# Patient Record
Sex: MALE | Race: WHITE | Employment: FULL TIME | URBAN - METROPOLITAN AREA
[De-identification: names, ages, dates, MRNs, and addresses within clinical notes are randomized per-mention and may not be internally consistent; named-entity substitution may affect disease eponyms.]

---

## 2017-11-14 NOTE — TELEPHONE ENCOUNTER
Refill request for Lamotrigine 150 mg taking 2 tabs in the morning and 3 tabs in the evening, qt:150 0 refills    LOV: 11/28/16  NOV: None  Last refill: 11/28/16 qt:150 12 refills

## 2017-11-15 RX ORDER — LAMOTRIGINE 150 MG/1
TABLET ORAL
Qty: 150 TABLET | Refills: 0 | Status: SHIPPED | OUTPATIENT
Start: 2017-11-15 | End: 2017-12-19

## 2017-12-12 RX ORDER — CARBAMAZEPINE 300 MG/1
CAPSULE, EXTENDED RELEASE ORAL
Qty: 120 CAPSULE | Refills: 0 | OUTPATIENT
Start: 2017-12-12

## 2017-12-14 ENCOUNTER — TELEPHONE (OUTPATIENT)
Dept: NEUROLOGY | Facility: CLINIC | Age: 52
End: 2017-12-14

## 2017-12-15 ENCOUNTER — OFFICE VISIT (OUTPATIENT)
Dept: INTERNAL MEDICINE CLINIC | Facility: CLINIC | Age: 52
End: 2017-12-15

## 2017-12-15 VITALS
HEART RATE: 76 BPM | SYSTOLIC BLOOD PRESSURE: 132 MMHG | HEIGHT: 72 IN | RESPIRATION RATE: 19 BRPM | OXYGEN SATURATION: 99 % | WEIGHT: 223 LBS | DIASTOLIC BLOOD PRESSURE: 78 MMHG | BODY MASS INDEX: 30.2 KG/M2 | TEMPERATURE: 98 F

## 2017-12-15 DIAGNOSIS — Z23 NEED FOR INFLUENZA VACCINATION: ICD-10-CM

## 2017-12-15 DIAGNOSIS — E78.2 MIXED HYPERLIPIDEMIA: ICD-10-CM

## 2017-12-15 DIAGNOSIS — Z12.5 PROSTATE CANCER SCREENING: ICD-10-CM

## 2017-12-15 DIAGNOSIS — G40.909 SEIZURE DISORDER (HCC): Primary | ICD-10-CM

## 2017-12-15 DIAGNOSIS — Z28.20 VACCINE REFUSED BY PATIENT: ICD-10-CM

## 2017-12-15 DIAGNOSIS — E66.9 OBESITY (BMI 30-39.9): ICD-10-CM

## 2017-12-15 DIAGNOSIS — L40.50 PSORIATIC ARTHRITIS (HCC): ICD-10-CM

## 2017-12-15 PROCEDURE — 99213 OFFICE O/P EST LOW 20 MIN: CPT | Performed by: INTERNAL MEDICINE

## 2017-12-16 PROBLEM — E78.2 MIXED HYPERLIPIDEMIA: Status: ACTIVE | Noted: 2017-12-16

## 2017-12-16 NOTE — PROGRESS NOTES
HPI:    Patient ID: Olga Syed is a 45year old male. HPI     Patient is her for follow up, also needing referral to see Neuro for seizure disorder. Had been doing well with no recurrence of seizure.    He had been traveling and maintains active l person, place, and time. Skin: No rash noted. Psychiatric: Judgment normal.   Nursing note and vitals reviewed.              ASSESSMENT/PLAN:   Seizure disorder (hcc)  Psoriatic arthritis (hcc)  Need for influenza vaccination  (primary encounter diagnos

## 2017-12-19 ENCOUNTER — OFFICE VISIT (OUTPATIENT)
Dept: NEUROLOGY | Facility: CLINIC | Age: 52
End: 2017-12-19

## 2017-12-19 ENCOUNTER — APPOINTMENT (OUTPATIENT)
Dept: LAB | Facility: HOSPITAL | Age: 52
End: 2017-12-19
Attending: INTERNAL MEDICINE
Payer: COMMERCIAL

## 2017-12-19 VITALS
HEART RATE: 64 BPM | DIASTOLIC BLOOD PRESSURE: 86 MMHG | HEIGHT: 72 IN | RESPIRATION RATE: 16 BRPM | BODY MASS INDEX: 30.61 KG/M2 | SYSTOLIC BLOOD PRESSURE: 128 MMHG | WEIGHT: 226 LBS

## 2017-12-19 DIAGNOSIS — Z12.5 PROSTATE CANCER SCREENING: ICD-10-CM

## 2017-12-19 DIAGNOSIS — G40.909 SEIZURE DISORDER (HCC): Primary | ICD-10-CM

## 2017-12-19 PROCEDURE — 99213 OFFICE O/P EST LOW 20 MIN: CPT | Performed by: OTHER

## 2017-12-19 PROCEDURE — 36415 COLL VENOUS BLD VENIPUNCTURE: CPT | Performed by: INTERNAL MEDICINE

## 2017-12-19 PROCEDURE — 80061 LIPID PANEL: CPT | Performed by: INTERNAL MEDICINE

## 2017-12-19 PROCEDURE — 80053 COMPREHEN METABOLIC PANEL: CPT | Performed by: INTERNAL MEDICINE

## 2017-12-19 PROCEDURE — 85025 COMPLETE CBC W/AUTO DIFF WBC: CPT | Performed by: INTERNAL MEDICINE

## 2017-12-19 RX ORDER — CARBAMAZEPINE 300 MG/1
600 CAPSULE, EXTENDED RELEASE ORAL 2 TIMES DAILY
Qty: 120 CAPSULE | Refills: 12 | Status: SHIPPED | OUTPATIENT
Start: 2017-12-19 | End: 2018-10-31

## 2017-12-19 RX ORDER — LAMOTRIGINE 150 MG/1
TABLET ORAL
Qty: 150 TABLET | Refills: 12 | Status: SHIPPED | OUTPATIENT
Start: 2017-12-19 | End: 2018-10-31

## 2017-12-26 ENCOUNTER — TELEPHONE (OUTPATIENT)
Dept: INTERNAL MEDICINE CLINIC | Facility: CLINIC | Age: 52
End: 2017-12-26

## 2017-12-26 DIAGNOSIS — Z51.81 THERAPEUTIC DRUG MONITORING: ICD-10-CM

## 2017-12-26 DIAGNOSIS — L40.50 PSA (PSORIATIC ARTHRITIS) (HCC): Primary | ICD-10-CM

## 2017-12-26 DIAGNOSIS — L40.50 PSORIATIC ARTHRITIS (HCC): Primary | ICD-10-CM

## 2017-12-26 NOTE — TELEPHONE ENCOUNTER
Pt called in requesting a new rx to be sent to his pharmacy regarding Humira medication below. Pt states the pharmacy has an  script. Pt states the Humira is to be sent to /[facility] - phone # [phone].   Pt would li

## 2017-12-26 NOTE — TELEPHONE ENCOUNTER
LOV 11/21/16 -No F/u sched     Called patient - booked FA apt 2/14/18 at 2:50pm.    Humira-Needs refill asap. Pt reports he has 2 weeks left of med but it takes 5 days to get med from mail order pharm after ordered.      Pended med for PP

## 2017-12-26 NOTE — TELEPHONE ENCOUNTER
Sent in 1month of meds - that should be fine until next appt. Or he can make an appt. Sooner.    He needs his yearly tb test done and can come to the labs and get that - please let him know

## 2017-12-26 NOTE — TELEPHONE ENCOUNTER
Spoke with Gonzalo Patel and discussed note below from [address] [place] [address]. Pt states he will have TB test drawn tomorrow 12/27/17.

## 2017-12-27 ENCOUNTER — APPOINTMENT (OUTPATIENT)
Dept: LAB | Age: 52
End: 2017-12-27
Attending: INTERNAL MEDICINE
Payer: COMMERCIAL

## 2017-12-27 ENCOUNTER — TELEPHONE (OUTPATIENT)
Dept: RHEUMATOLOGY | Facility: CLINIC | Age: 52
End: 2017-12-27

## 2017-12-27 DIAGNOSIS — Z51.81 THERAPEUTIC DRUG MONITORING: ICD-10-CM

## 2017-12-27 DIAGNOSIS — L40.50 PSA (PSORIATIC ARTHRITIS) (HCC): ICD-10-CM

## 2017-12-27 PROCEDURE — 36415 COLL VENOUS BLD VENIPUNCTURE: CPT

## 2017-12-27 PROCEDURE — 86480 TB TEST CELL IMMUN MEASURE: CPT

## 2017-12-27 NOTE — TELEPHONE ENCOUNTER
Form completed for pt assistance for humira pre filled syringes. Faxed to [phone]. Pt follow up scheduled 02/14/18.

## 2018-02-14 ENCOUNTER — OFFICE VISIT (OUTPATIENT)
Dept: RHEUMATOLOGY | Facility: CLINIC | Age: 53
End: 2018-02-14

## 2018-02-14 VITALS
HEIGHT: 72 IN | BODY MASS INDEX: 31.27 KG/M2 | WEIGHT: 230.88 LBS | DIASTOLIC BLOOD PRESSURE: 79 MMHG | SYSTOLIC BLOOD PRESSURE: 132 MMHG | TEMPERATURE: 98 F

## 2018-02-14 DIAGNOSIS — Z51.81 THERAPEUTIC DRUG MONITORING: ICD-10-CM

## 2018-02-14 DIAGNOSIS — L40.50 PSA (PSORIATIC ARTHRITIS) (HCC): Primary | ICD-10-CM

## 2018-02-14 PROCEDURE — 99214 OFFICE O/P EST MOD 30 MIN: CPT | Performed by: INTERNAL MEDICINE

## 2018-02-14 PROCEDURE — 99212 OFFICE O/P EST SF 10 MIN: CPT | Performed by: INTERNAL MEDICINE

## 2018-02-14 NOTE — PROGRESS NOTES
Rhiannon Ferraro is a 45year old male who presents for Patient presents with:  Psoriatic Arthritis: follow up  . HPI:     I had the pleasure of seeing Rhiannon Ferraro on 11/21/2016 for evaluation.      He is  A pleasant 46year old who has psoriatic art CarBAMazepine  MG Oral Capsule SR 12 Hr Take 2 capsules (600 mg total) by mouth 2 (two) times daily.  Disp: 120 capsule Rfl: 12   lamoTRIgine 150 MG Oral Tab 2 tablets in the morning and 3 tablets in the evening Disp: 150 tablet Rfl: 12      Past Medi /79 (BP Location: Left arm, Patient Position: Sitting, Cuff Size: large)   Temp 97.9 °F (36.6 °C) (Oral)   Ht 6' (1.829 m)   Wt 230 lb 14.4 oz (104.7 kg)   BMI 31.32 kg/m²   HEENT: Clear oropharynx, no oral ulcers, EOM intact, clear sclear, PERRLA, p Platelet Count      335 - 400 K/UL  258   MEAN PLATELET VOLUME      7.4 - 10.3 fL  7.3 (L)   Neutrophils %      %  50   Lymphocytes %      %  39   Monocytes %      %  9   Eosinophils %      %  2   Basophils %      %  1   Neutrophils Absolute      1.8 - 7.7

## 2018-03-16 ENCOUNTER — MED REC SCAN ONLY (OUTPATIENT)
Dept: RHEUMATOLOGY | Facility: CLINIC | Age: 53
End: 2018-03-16

## 2018-05-03 ENCOUNTER — TELEPHONE (OUTPATIENT)
Dept: RHEUMATOLOGY | Facility: CLINIC | Age: 53
End: 2018-05-03

## 2018-05-03 NOTE — TELEPHONE ENCOUNTER
PA started with [employer] [phone] RN#[id number]. Script will have to go to [facility].

## 2018-05-07 NOTE — TELEPHONE ENCOUNTER
Pt said he received rejection letter from [place] patient assistance foundation  stating PA needs to be done for [facility]  Would like callback to discuss

## 2018-05-08 NOTE — TELEPHONE ENCOUNTER
Spoke with mother and aware office is working on [employer] for [employer]. PA approved until 5/3/19 Case #[id number]. Spoke with mother and given pharmacy information. Advised to have pt call [employer] for co-pay card. Call pharmacy with co-pay information.  Mother will hav

## 2018-09-11 ENCOUNTER — OFFICE VISIT (OUTPATIENT)
Dept: RHEUMATOLOGY | Facility: CLINIC | Age: 53
End: 2018-09-11

## 2018-09-11 VITALS
HEIGHT: 72 IN | WEIGHT: 222 LBS | BODY MASS INDEX: 30.07 KG/M2 | HEART RATE: 70 BPM | DIASTOLIC BLOOD PRESSURE: 77 MMHG | SYSTOLIC BLOOD PRESSURE: 131 MMHG

## 2018-09-11 DIAGNOSIS — L40.50 PSA (PSORIATIC ARTHRITIS) (HCC): Primary | ICD-10-CM

## 2018-09-11 DIAGNOSIS — Z51.81 THERAPEUTIC DRUG MONITORING: ICD-10-CM

## 2018-09-11 PROCEDURE — 99212 OFFICE O/P EST SF 10 MIN: CPT | Performed by: INTERNAL MEDICINE

## 2018-09-11 PROCEDURE — 99214 OFFICE O/P EST MOD 30 MIN: CPT | Performed by: INTERNAL MEDICINE

## 2018-09-11 NOTE — PROGRESS NOTES
Martha Merlos is a 45year old male who presents for Patient presents with:  Psoriatic Arthritis  . HPI:     I had the pleasure of seeing Martha Merlos on 11/21/2016 for evaluation. He is  A pleasant 46year old who has psoriatic arthriits.    H Current Outpatient Medications:  Adalimumab 40 MG/0.8ML Subcutaneous Prefilled Syringe Kit Inject 40 mg into the skin every 14 (fourteen) days.  Disp: 2 each Rfl: 5   CarBAMazepine  MG Oral Capsule SR 12 Hr Take 2 capsules (600 mg total) by mouth 2 (t Psych: no hx of anxiety or depression -   ENDO: no hx of thyroid disease, no hx of DM  Joint/Muscluskeltal: see HPI,   All other ROS are negative.      EXAM:   /77 (BP Location: Left arm, Patient Position: Sitting, Cuff Size: adult)   Pulse 70   Ht 6' 80.0 - 100.0 fL  100.1 (H)   MCH      27.0 - 32.0 pg  34.7 (H)   MCHC      32.0 - 37.0 g/dl  34.7   RDW      11.0 - 15.0 %  12.7   Platelet Count      393 - 400 K/UL  258   MEAN PLATELET VOLUME      7.4 - 10.3 fL  7.3 (L)   Neutrophils %      %  50   L - [place]ed [place]  through [place]

## 2018-10-02 ENCOUNTER — OFFICE VISIT (OUTPATIENT)
Dept: INTERNAL MEDICINE CLINIC | Facility: CLINIC | Age: 53
End: 2018-10-02

## 2018-10-02 VITALS
TEMPERATURE: 98 F | RESPIRATION RATE: 20 BRPM | DIASTOLIC BLOOD PRESSURE: 82 MMHG | HEART RATE: 78 BPM | WEIGHT: 219 LBS | HEIGHT: 72 IN | BODY MASS INDEX: 29.66 KG/M2 | SYSTOLIC BLOOD PRESSURE: 130 MMHG | OXYGEN SATURATION: 98 %

## 2018-10-02 DIAGNOSIS — Z00.00 GENERAL MEDICAL EXAM: ICD-10-CM

## 2018-10-02 DIAGNOSIS — G40.909 SEIZURE DISORDER (HCC): ICD-10-CM

## 2018-10-02 DIAGNOSIS — D23.4 DERMOID CYST OF SCALP: Primary | ICD-10-CM

## 2018-10-02 DIAGNOSIS — Z23 NEED FOR PROPHYLACTIC VACCINATION AND INOCULATION AGAINST INFLUENZA: ICD-10-CM

## 2018-10-02 DIAGNOSIS — L40.50 PSORIATIC ARTHROPATHY (HCC): ICD-10-CM

## 2018-10-02 PROCEDURE — 99213 OFFICE O/P EST LOW 20 MIN: CPT | Performed by: INTERNAL MEDICINE

## 2018-10-03 NOTE — PROGRESS NOTES
HPI:    Patient ID: Milagro Adam is a 45year old male. HPI   Patient here reporting soft nodule on his vertex. Lesion is not painful. He just noticed recently when combing. Reports no headache,visual disturbance. He has no head trauma.      H/o seiz motion. Neck supple. Cardiovascular: Normal rate, regular rhythm and normal heart sounds. Pulmonary/Chest: Effort normal and breath sounds normal.   Abdominal: Soft. Bowel sounds are normal. There is no hepatosplenomegaly.    Musculoskeletal:   No join

## 2018-10-05 ENCOUNTER — OFFICE VISIT (OUTPATIENT)
Dept: INTERNAL MEDICINE CLINIC | Facility: CLINIC | Age: 53
End: 2018-10-05

## 2018-10-05 VITALS
OXYGEN SATURATION: 99 % | WEIGHT: 219 LBS | HEART RATE: 82 BPM | RESPIRATION RATE: 21 BRPM | DIASTOLIC BLOOD PRESSURE: 78 MMHG | TEMPERATURE: 98 F | SYSTOLIC BLOOD PRESSURE: 136 MMHG | HEIGHT: 72 IN | BODY MASS INDEX: 29.66 KG/M2

## 2018-10-05 DIAGNOSIS — L40.50 PSA (PSORIATIC ARTHRITIS) (HCC): ICD-10-CM

## 2018-10-05 DIAGNOSIS — E66.3 OVERWEIGHT (BMI 25.0-29.9): ICD-10-CM

## 2018-10-05 DIAGNOSIS — G40.909 SEIZURE DISORDER (HCC): ICD-10-CM

## 2018-10-05 DIAGNOSIS — Z12.11 COLON CANCER SCREENING: ICD-10-CM

## 2018-10-05 DIAGNOSIS — E78.2 MIXED HYPERLIPIDEMIA: ICD-10-CM

## 2018-10-05 DIAGNOSIS — Z00.00 GENERAL MEDICAL EXAM: Primary | ICD-10-CM

## 2018-10-05 PROCEDURE — 82274 ASSAY TEST FOR BLOOD FECAL: CPT | Performed by: INTERNAL MEDICINE

## 2018-10-05 PROCEDURE — 90471 IMMUNIZATION ADMIN: CPT | Performed by: INTERNAL MEDICINE

## 2018-10-05 PROCEDURE — 99396 PREV VISIT EST AGE 40-64: CPT | Performed by: INTERNAL MEDICINE

## 2018-10-05 PROCEDURE — 80053 COMPREHEN METABOLIC PANEL: CPT | Performed by: INTERNAL MEDICINE

## 2018-10-05 PROCEDURE — 85652 RBC SED RATE AUTOMATED: CPT | Performed by: INTERNAL MEDICINE

## 2018-10-05 PROCEDURE — 80061 LIPID PANEL: CPT | Performed by: INTERNAL MEDICINE

## 2018-10-05 PROCEDURE — 86480 TB TEST CELL IMMUN MEASURE: CPT | Performed by: INTERNAL MEDICINE

## 2018-10-05 PROCEDURE — 86140 C-REACTIVE PROTEIN: CPT | Performed by: INTERNAL MEDICINE

## 2018-10-05 PROCEDURE — 90686 IIV4 VACC NO PRSV 0.5 ML IM: CPT | Performed by: INTERNAL MEDICINE

## 2018-10-05 PROCEDURE — 85027 COMPLETE CBC AUTOMATED: CPT | Performed by: INTERNAL MEDICINE

## 2018-10-05 NOTE — PROGRESS NOTES
Jessica Badillo is a 45year old male who presents for a complete physical exam.     HPI:   Pt has a soft tissue nodule on his vertex and scheduled to see Derm . Denies pain, headache. He had weight loss .  Took care of mother for several months who Sister       Social History    Tobacco Use      Smoking status: Never Smoker      Smokeless tobacco: Never Used      Tobacco comment: never smoked     Alcohol use: No      Alcohol/week: 0.0 oz    Drug use: No     Social History: Occ: manual work  : a complete physical exam.      Body mass index is 29.7 kg/m². , recommended low fat diet, low carbs, high fiber, lean protein diet and  and aerobic exercise 30 minutes three times weekly. Health maintenance, will check fasting Lipids, CMP, CBC .      Nex

## 2018-10-05 NOTE — PROGRESS NOTES
Flu shot administered in LEFT arm IM  Patient denies allergy to eggs, flu shot before, being sick today, diagnosed with Guillain barre syndrome. Patient tolerated well no reaction at this time.   Date of Birth Verified   Ordering

## 2018-10-08 ENCOUNTER — TELEPHONE (OUTPATIENT)
Dept: RHEUMATOLOGY | Facility: CLINIC | Age: 53
End: 2018-10-08

## 2018-10-08 DIAGNOSIS — D72.819 LEUKOPENIA, UNSPECIFIED TYPE: Primary | ICD-10-CM

## 2018-10-09 NOTE — TELEPHONE ENCOUNTER
Spoke with pt. He is aware of results and will repeat CBC in one month. Pt will call office with any questions or concerns. Aware of results and recommendations. Pt agreeable with plan.

## 2018-10-09 NOTE — TELEPHONE ENCOUNTER
Left a message for pt. In my chart - but can you call and tell him to repeat his cbc in 1 month - the white count is a little low and just want to make sure it normalizes.

## 2018-10-09 NOTE — TELEPHONE ENCOUNTER
LMTCB  To repeat CBC in one month. Order mailed to pt as well. Per My Chart message view by pt.      Viewed by Michael Marin on 10/8/2018 10:36 PM   Written by Alida Sanchez MD on 10/8/2018 10:18 PM   White count is a little low,, please call I

## 2018-10-17 RX ORDER — ADALIMUMAB 40MG/0.8ML
KIT SUBCUTANEOUS
Qty: 2 EACH | Status: SHIPPED | OUTPATIENT
Start: 2018-10-17 | End: 2019-04-05

## 2018-10-17 NOTE — TELEPHONE ENCOUNTER
LOV:9-11  Last Filled:5-8, 2 with 5 refills  Labs:   Future Appointments   Date Time Provider Gerardo Aliyah   10/31/2018  9:30 AM Lashon Mclean MD [facility]   11/12/2018  9:00 AM Felipe Enrique MD [facility]   4/5/2019 10

## 2018-10-31 ENCOUNTER — OFFICE VISIT (OUTPATIENT)
Dept: NEUROLOGY | Facility: CLINIC | Age: 53
End: 2018-10-31

## 2018-10-31 VITALS
RESPIRATION RATE: 16 BRPM | HEIGHT: 72 IN | SYSTOLIC BLOOD PRESSURE: 124 MMHG | DIASTOLIC BLOOD PRESSURE: 82 MMHG | WEIGHT: 228 LBS | HEART RATE: 72 BPM | BODY MASS INDEX: 30.88 KG/M2

## 2018-10-31 DIAGNOSIS — G40.909 SEIZURE DISORDER (HCC): Primary | ICD-10-CM

## 2018-10-31 PROCEDURE — 99214 OFFICE O/P EST MOD 30 MIN: CPT | Performed by: OTHER

## 2018-10-31 RX ORDER — LAMOTRIGINE 150 MG/1
TABLET ORAL
Qty: 150 TABLET | Refills: 12 | Status: SHIPPED | OUTPATIENT
Start: 2018-10-31 | End: 2019-05-16

## 2018-10-31 RX ORDER — CARBAMAZEPINE 300 MG/1
600 CAPSULE, EXTENDED RELEASE ORAL 2 TIMES DAILY
Qty: 120 CAPSULE | Refills: 12 | Status: SHIPPED | OUTPATIENT
Start: 2018-10-31 | End: 2019-05-16

## 2018-10-31 NOTE — PROGRESS NOTES
Neurology Follow up Visit     Referred By: Dr. Baca Jono: Patient presents with:  Seizures: LOV: 12/19/17. Former Dr. Dimitris Allen patient presents for a f/u for seizure disorder.  Patient states since his LOV, things have been the same and denies capsule, Rfl: 12  •  HUMIRA 40 MG/0.8ML Subcutaneous Prefilled Syringe Kit, INJECT 40 MG INTO THE SKIN EVERY 14 (FOURTEEN) DAYS., Disp: 2 each, Rfl: PRN    No Known Allergies    ROS:   As in HPI, the rest of the 14 system review was done and was negative posture  Normal physiologic      Labs:    No results found for: TSH  Lab Results   Component Value Date    HDL 32 10/05/2018     (H) 10/05/2018    TRIG 251 (H) 10/05/2018     Lab Results   Component Value Date    HGB 15.1 10/05/2018    HCT 45.1 10/0

## 2018-11-08 ENCOUNTER — LAB ENCOUNTER (OUTPATIENT)
Dept: LAB | Age: 53
End: 2018-11-08
Attending: INTERNAL MEDICINE
Payer: COMMERCIAL

## 2018-11-08 DIAGNOSIS — D72.819 LEUKOPENIA, UNSPECIFIED TYPE: ICD-10-CM

## 2018-11-08 PROCEDURE — 85025 COMPLETE CBC W/AUTO DIFF WBC: CPT

## 2018-11-08 PROCEDURE — 36415 COLL VENOUS BLD VENIPUNCTURE: CPT

## 2018-11-12 ENCOUNTER — HOSPITAL ENCOUNTER (OUTPATIENT)
Dept: GENERAL RADIOLOGY | Age: 53
Discharge: HOME | End: 2018-11-12
Attending: DERMATOLOGY
Payer: COMMERCIAL

## 2018-11-12 ENCOUNTER — OFFICE VISIT (OUTPATIENT)
Dept: DERMATOLOGY CLINIC | Facility: CLINIC | Age: 53
End: 2018-11-12

## 2018-11-12 VITALS — WEIGHT: 228 LBS | BODY MASS INDEX: 30.88 KG/M2 | HEIGHT: 72 IN

## 2018-11-12 DIAGNOSIS — L98.9 SCALP LESION: ICD-10-CM

## 2018-11-12 DIAGNOSIS — L98.9 SCALP LESION: Primary | ICD-10-CM

## 2018-11-12 PROCEDURE — 70250 X-RAY EXAM OF SKULL: CPT | Performed by: DERMATOLOGY

## 2018-11-12 PROCEDURE — 99202 OFFICE O/P NEW SF 15 MIN: CPT | Performed by: DERMATOLOGY

## 2018-11-12 PROCEDURE — 99212 OFFICE O/P EST SF 10 MIN: CPT | Performed by: DERMATOLOGY

## 2018-11-12 NOTE — PROGRESS NOTES
HPI:     Chief Complaint     Derm Problem        HPI     Derm Problem      Additional comments: cyst on back of scalp ,denies itch ,bleed or pain ,x 2 months denies personal HX of SC ,mother had SC of unknown type           Last edited by Iris Carroll, Social Needs      Financial resource strain: Not on file      Food insecurity - worry: Not on file      Food insecurity - inability: Not on file      Transportation needs - medical: Not on file      Transportation needs - non-medical: Not on file    Avalon were unremarkable as far as any bony abnormality or in the area. No facial lesions appreciated since asymptomatic, do not think any treatment or further evaluation is necessary at this time.   Patient however is instructed to follow-up if any significant g

## 2018-12-09 ENCOUNTER — PATIENT MESSAGE (OUTPATIENT)
Dept: RHEUMATOLOGY | Facility: CLINIC | Age: 53
End: 2018-12-09

## 2018-12-10 NOTE — TELEPHONE ENCOUNTER
From: Madeline Gonzalez  To: Sagar Negro MD  Sent: 12/9/2018 9:13 PM CST  Subject: Other    I received in the mail; about the humira citrate-free; should we switch me over to that instead of the present injection format?   TY

## 2018-12-20 RX ORDER — CARBAMAZEPINE 300 MG/1
CAPSULE, EXTENDED RELEASE ORAL
Qty: 120 CAPSULE | Refills: 0 | OUTPATIENT
Start: 2018-12-20

## 2018-12-20 RX ORDER — LAMOTRIGINE 150 MG/1
TABLET ORAL
Qty: 150 TABLET | Refills: 0 | OUTPATIENT
Start: 2018-12-20

## 2018-12-20 NOTE — TELEPHONE ENCOUNTER
Medication request:    Lamotrigine 150mg. Take 2 tablets in the am 3 tablets in the evening. #150. Carbamazepine ER 300mg. VK'A denied. Filled on 10/31/2018 with 12 refills.

## 2019-01-16 ENCOUNTER — TELEPHONE (OUTPATIENT)
Dept: RHEUMATOLOGY | Facility: CLINIC | Age: 54
End: 2019-01-16

## 2019-01-16 NOTE — TELEPHONE ENCOUNTER
Lisandro[facility] [phone] states that they need prior authorization for the Humira medication for the patient. Per Lisandro this is for a high dollar review. She will also be sending a fax for this.

## 2019-01-17 NOTE — TELEPHONE ENCOUNTER
PA needed for high dollar review. Medication PA Requested:    Humira                                                      Pt insurance/number to contact:[employer] [phone]  Form to be faxed to [phone]  CoverMyMeds Used:    Key:   Insurance ID# and g

## 2019-01-18 NOTE — TELEPHONE ENCOUNTER
Per insurance PA in place that is effective until 5/3/2019, PA form faxed to [phone] (pharmacy PA department).

## 2019-04-05 ENCOUNTER — OFFICE VISIT (OUTPATIENT)
Dept: INTERNAL MEDICINE CLINIC | Facility: CLINIC | Age: 54
End: 2019-04-05

## 2019-04-05 VITALS
OXYGEN SATURATION: 99 % | DIASTOLIC BLOOD PRESSURE: 80 MMHG | BODY MASS INDEX: 28.99 KG/M2 | HEART RATE: 68 BPM | TEMPERATURE: 98 F | SYSTOLIC BLOOD PRESSURE: 130 MMHG | HEIGHT: 72 IN | WEIGHT: 214 LBS | RESPIRATION RATE: 20 BRPM

## 2019-04-05 DIAGNOSIS — L40.50 PSORIATIC ARTHROPATHY (HCC): ICD-10-CM

## 2019-04-05 DIAGNOSIS — E78.2 MIXED HYPERLIPIDEMIA: Primary | ICD-10-CM

## 2019-04-05 DIAGNOSIS — D23.4 DERMOID CYST OF SCALP: ICD-10-CM

## 2019-04-05 DIAGNOSIS — R63.4 WEIGHT LOSS: ICD-10-CM

## 2019-04-05 DIAGNOSIS — Z23 IMMUNIZATION DUE: ICD-10-CM

## 2019-04-05 DIAGNOSIS — G40.909 SEIZURE DISORDER (HCC): ICD-10-CM

## 2019-04-05 PROCEDURE — 80061 LIPID PANEL: CPT | Performed by: INTERNAL MEDICINE

## 2019-04-05 PROCEDURE — 82306 VITAMIN D 25 HYDROXY: CPT | Performed by: INTERNAL MEDICINE

## 2019-04-05 PROCEDURE — 80053 COMPREHEN METABOLIC PANEL: CPT | Performed by: INTERNAL MEDICINE

## 2019-04-05 PROCEDURE — 90471 IMMUNIZATION ADMIN: CPT | Performed by: INTERNAL MEDICINE

## 2019-04-05 PROCEDURE — 84443 ASSAY THYROID STIM HORMONE: CPT | Performed by: INTERNAL MEDICINE

## 2019-04-05 PROCEDURE — 85025 COMPLETE CBC W/AUTO DIFF WBC: CPT | Performed by: INTERNAL MEDICINE

## 2019-04-05 PROCEDURE — 99214 OFFICE O/P EST MOD 30 MIN: CPT | Performed by: INTERNAL MEDICINE

## 2019-04-05 PROCEDURE — 36415 COLL VENOUS BLD VENIPUNCTURE: CPT | Performed by: INTERNAL MEDICINE

## 2019-04-05 PROCEDURE — 90715 TDAP VACCINE 7 YRS/> IM: CPT | Performed by: INTERNAL MEDICINE

## 2019-04-05 NOTE — PROGRESS NOTES
HPI:    Patient ID: Antoine Child is a 52year old male. HPI     Here for f/u. He has 14  Lbs  w loss. He had eaten more healthy . He has pressure cooker and cooks his own meal instead of eating from restaurant. He maintains active lifestyle.  Denies Mouth/Throat: No pharynx erythema. Eyes: Conjunctivae and EOM are normal. Pupils are equal, round, and reactive to light. Neck: Neck supple. No JVD present. Cardiovascular: Normal rate, regular rhythm, normal heart sounds and intact distal pulses.

## 2019-04-05 NOTE — PROGRESS NOTES
Pt presented to clinic today for blood draw. Per physician able to draw orders. Orders  documented within chart. Pt tolerated lab draw well.  verified.   Orders drawn include: vit d, tsh, lipid, cmp, cbc   Site of draw: RANDI Collazo arm

## 2019-04-10 NOTE — TELEPHONE ENCOUNTER
[facility] Hospitalist Progress Note     PCP: Hans Nieves MD    Chief Complaint: follow-up    SUBJECTIVE:  Doing well. Pain controlled. No sob/dizziness/nausea.      OBJECTIVE:  Temp:  [98.6 °F (37 °C)-98.9 °F (37.2 °C)] 98.9 °F (37.2 °C)  Pulse:  [80-89] 8 Please see below. Pt states he is due for Humira refill next month and would like to try citrate-free formulation. Pharmacy verified and script pended. Please advise. Vitamin B-12  100 mcg Oral Daily   • Fluticasone Propionate  1 spray Nasal Daily   • folic acid  1 mg Oral Daily   • Metoprolol Succinate ER  25 mg Oral Nightly   • Potassium Citrate ER  10 mEq Oral Daily   • furosemide  20 mg Oral Daily   • Methotrexate S

## 2019-04-14 RX ORDER — ERGOCALCIFEROL 1.25 MG/1
50000 CAPSULE ORAL WEEKLY
Qty: 12 CAPSULE | Refills: 0 | Status: SHIPPED | OUTPATIENT
Start: 2019-04-14 | End: 2019-07-04

## 2019-04-15 ENCOUNTER — PATIENT MESSAGE (OUTPATIENT)
Dept: RHEUMATOLOGY | Facility: CLINIC | Age: 54
End: 2019-04-15

## 2019-04-15 NOTE — TELEPHONE ENCOUNTER
From: Sandra Avelar  To: Mag Willis MD  Sent: 4/15/2019 4:37 AM CDT  Subject: Non-Urgent Medical Question    Correction I see Dr Leydi Oconnor after I see you for my appointment in September.

## 2019-04-29 ENCOUNTER — OFFICE VISIT (OUTPATIENT)
Dept: RHEUMATOLOGY | Facility: CLINIC | Age: 54
End: 2019-04-29

## 2019-04-29 ENCOUNTER — LAB ENCOUNTER (OUTPATIENT)
Dept: LAB | Facility: HOSPITAL | Age: 54
End: 2019-04-29
Attending: INTERNAL MEDICINE
Payer: COMMERCIAL

## 2019-04-29 VITALS
HEART RATE: 65 BPM | HEIGHT: 73 IN | WEIGHT: 205 LBS | DIASTOLIC BLOOD PRESSURE: 96 MMHG | BODY MASS INDEX: 27.17 KG/M2 | SYSTOLIC BLOOD PRESSURE: 148 MMHG

## 2019-04-29 DIAGNOSIS — L40.50 PSA (PSORIATIC ARTHRITIS) (HCC): Primary | ICD-10-CM

## 2019-04-29 DIAGNOSIS — Z51.81 THERAPEUTIC DRUG MONITORING: ICD-10-CM

## 2019-04-29 DIAGNOSIS — L40.50 PSA (PSORIATIC ARTHRITIS) (HCC): ICD-10-CM

## 2019-04-29 PROCEDURE — 85027 COMPLETE CBC AUTOMATED: CPT

## 2019-04-29 PROCEDURE — 86038 ANTINUCLEAR ANTIBODIES: CPT

## 2019-04-29 PROCEDURE — 99212 OFFICE O/P EST SF 10 MIN: CPT | Performed by: INTERNAL MEDICINE

## 2019-04-29 PROCEDURE — 36415 COLL VENOUS BLD VENIPUNCTURE: CPT

## 2019-04-29 PROCEDURE — 86235 NUCLEAR ANTIGEN ANTIBODY: CPT

## 2019-04-29 PROCEDURE — 86225 DNA ANTIBODY NATIVE: CPT

## 2019-04-29 PROCEDURE — 85652 RBC SED RATE AUTOMATED: CPT

## 2019-04-29 PROCEDURE — 99214 OFFICE O/P EST MOD 30 MIN: CPT | Performed by: INTERNAL MEDICINE

## 2019-04-29 PROCEDURE — 86039 ANTINUCLEAR ANTIBODIES (ANA): CPT

## 2019-04-29 PROCEDURE — 86140 C-REACTIVE PROTEIN: CPT

## 2019-04-29 RX ORDER — PREDNISONE 10 MG/1
TABLET ORAL
Qty: 42 TABLET | Refills: 0 | Status: SHIPPED | OUTPATIENT
Start: 2019-04-29 | End: 2019-05-16

## 2019-04-29 NOTE — PATIENT INSTRUCTIONS
1. humira 40mg every other week   2 check labs today   3  Return to clinic in 3 months.    4. Prednisone 10mg - Take 6 tabsx 2 day, take 5 tabsx 2 day, take 4 tabsx 2 day,take 3 tabsx 2 day, take 2 tabsx 2 day, take 1 tabsx 2 day, then off

## 2019-04-29 NOTE — PROGRESS NOTES
Martha Merlos is a 52year old male who presents for Patient presents with:  Psoriatic Arthritis  Elbow Pain: pain from elbows to the fingertips  . HPI:     I had the pleasure of seeing Martha Merlos on 11/21/2016 for evaluation.      He is  A pleas Last week gave a medrol chuck - - it helped. Until this Saturday. His flare is better. He hasn't had flare up for years. No stress at the time.    He recently dx with vit d def and given vit d supplmeentaiton  He had a flare up of his elbows to his wrists a Drug use:  No     works in [employer] [facility]  Not  no kidney      REVIEW OF SYSTEMS:   Review Of Systems:  Fatigue  Constitutional:No fever, no change in weight or appetitie  Derm: No rashes, no oral ulcers, no alopecia, no photosensitivity, no psoriasi No plantar or heel tendneress   Not tender in ankles or feet  No edema    Component      Latest Ref Rng & Units 4/5/2019 11/8/2018   WBC      4.0 - 11.0 x10(3) uL 3.3 (L) 5.5   RBC      4.30 - 5.70 x10(6)uL 4.59 4.31 (L)   Hemoglobin      13.0 - 17.5 g/dL 2.8 - 4.4 g/dL 4.7 (H)    A/G Ratio      1.0 - 2.0 0.8 (L)    MEAN PLATELET VOLUME      7.4 - 10.3 fL  7.4   Cholesterol, Total      <200 mg/dL 161    HDL Cholesterol      40 - 59 mg/dL 39 (L)    Triglycerides      30 - 149 mg/dL 132    LDL Cholesterol 3  Return to clinic in 3 months.    4. Prednisone 10mg - Take 6 tabsx 2 day, take 5 tabsx 2 day, take 4 tabsx 2 day,take 3 tabsx 2 day, take 2 tabsx 2 day, take 1 tabsx 2 day, then off          Tor Morin MD  4/29/2019   10:48 AM  - Reviewed [place]-

## 2019-05-16 ENCOUNTER — OFFICE VISIT (OUTPATIENT)
Dept: NEUROLOGY | Facility: CLINIC | Age: 54
End: 2019-05-16

## 2019-05-16 VITALS
BODY MASS INDEX: 28.99 KG/M2 | HEIGHT: 72 IN | WEIGHT: 214 LBS | RESPIRATION RATE: 16 BRPM | SYSTOLIC BLOOD PRESSURE: 120 MMHG | HEART RATE: 76 BPM | DIASTOLIC BLOOD PRESSURE: 80 MMHG

## 2019-05-16 DIAGNOSIS — G40.909 SEIZURE DISORDER (HCC): Primary | ICD-10-CM

## 2019-05-16 PROCEDURE — 99213 OFFICE O/P EST LOW 20 MIN: CPT | Performed by: OTHER

## 2019-05-16 RX ORDER — CARBAMAZEPINE 300 MG/1
600 CAPSULE, EXTENDED RELEASE ORAL 2 TIMES DAILY
Qty: 120 CAPSULE | Refills: 12 | Status: SHIPPED | OUTPATIENT
Start: 2019-05-16

## 2019-05-16 RX ORDER — LAMOTRIGINE 150 MG/1
TABLET ORAL
Qty: 150 TABLET | Refills: 12 | Status: SHIPPED | OUTPATIENT
Start: 2019-05-16

## 2019-05-16 NOTE — PROGRESS NOTES
Neurology Follow up Visit     Referred By: Dr. Rebecca Chiu: Patient presents with:  Seizures: Patient here for follow up for seizures. Since LOV on 10/31/2018 patient reports 1 episode of \"dizzy spell\" last week which lasted 2 hours.  No recen total) by mouth 2 (two) times daily. , Disp: 120 capsule, Rfl: 12  •  predniSONE 10 MG Oral Tab, Take 1 tablet (10 mg total) by mouth daily. , Disp: 30 tablet, Rfl: 0  •  Adalimumab (HUMIRA) 40 MG/0.4ML Subcutaneous Prefilled Syringe Kit, Inject 40 mg into t symmetric  Ankle jerk 2+ bilateral symmetric    No clonus  No Babinski sign    Coordination:  Finger to nose intact  Rapid alternating movements intact    Gait:  Normal posture  Normal physiologic      Labs:    Lab Results   Component Value Date    TSH 1.5

## 2019-06-10 ENCOUNTER — TELEPHONE (OUTPATIENT)
Dept: RHEUMATOLOGY | Facility: CLINIC | Age: 54
End: 2019-06-10

## 2019-06-10 RX ORDER — PREDNISONE 10 MG/1
10 TABLET ORAL DAILY
Qty: 30 TABLET | Refills: 0 | Status: SHIPPED | OUTPATIENT
Start: 2019-06-10 | End: 2020-07-06

## 2019-06-10 NOTE — TELEPHONE ENCOUNTER
LOV:4-29  Last Filled:5-13, #30 with 0 refill  Labs:   Future Appointments   Date Time Provider Gerardo Thakuri   9/9/2019  8:40 AM Cecy Issa MD [address] [place] [address]   10/4/2019 10:00 AM Kimberly oSn MD [facility]   11/19/2019  1:15 PM Amber Jaime MD [facility]       Please Advise

## 2019-06-10 NOTE — TELEPHONE ENCOUNTER
LOV: 4/29/19  Last Refilled:#30, 0rfs 5/13/19      Future Appointments   Date Time Provider Gerardo Aliyah   9/9/2019  8:40 AM Nikole Max MD [facility]   10/4/2019 10:00 AM Aolndra Contreras MD [facility] [place]   11/19/2019  1:15 PM Jono Dong

## 2019-07-01 RX ORDER — ADALIMUMAB 40MG/0.4ML
KIT SUBCUTANEOUS
Qty: 2 EACH | Refills: 5 | Status: SHIPPED | OUTPATIENT
Start: 2019-07-01 | End: 2019-09-09

## 2019-07-01 NOTE — TELEPHONE ENCOUNTER
LOV: 4/29/19  Last Refilled:#2 pens, 5rfs 12/10/18    Future Appointments   Date Time Provider Gerardo Aliyah   9/9/2019  8:40 AM Melissa Hurtado MD [facility]   10/4/2019 10:00 AM Navi Harrison MD [facility] [place]   11/19/2019  1:15 PM Ko

## 2019-07-06 RX ORDER — ERGOCALCIFEROL 1.25 MG/1
CAPSULE ORAL
Qty: 12 CAPSULE | Refills: 0 | Status: SHIPPED | OUTPATIENT
Start: 2019-07-06 | End: 2019-09-26

## 2019-09-09 ENCOUNTER — OFFICE VISIT (OUTPATIENT)
Dept: RHEUMATOLOGY | Facility: CLINIC | Age: 54
End: 2019-09-09

## 2019-09-09 VITALS
HEIGHT: 72 IN | BODY MASS INDEX: 27.9 KG/M2 | RESPIRATION RATE: 16 BRPM | HEART RATE: 68 BPM | SYSTOLIC BLOOD PRESSURE: 123 MMHG | DIASTOLIC BLOOD PRESSURE: 83 MMHG | WEIGHT: 206 LBS

## 2019-09-09 DIAGNOSIS — Z51.81 THERAPEUTIC DRUG MONITORING: ICD-10-CM

## 2019-09-09 DIAGNOSIS — L40.50 PSA (PSORIATIC ARTHRITIS) (HCC): Primary | ICD-10-CM

## 2019-09-09 PROCEDURE — 99214 OFFICE O/P EST MOD 30 MIN: CPT | Performed by: INTERNAL MEDICINE

## 2019-09-09 NOTE — PATIENT INSTRUCTIONS
1. humira 40mg every other week   2. See how you do off prednisone   3. Consider adding plaquenil if a few symptoms that are bothersome that seem to be related to humira -   4.   Return to clinic in 4- 6  Months if not able to transition -   - he moved to

## 2019-09-09 NOTE — PROGRESS NOTES
Ida Carlson is a 52year old male who presents for Patient presents with: Follow - Up  Psoriatic Arthritis  . HPI:     I had the pleasure of seeing Ida Carlson on 11/21/2016 for evaluation.      He is  A pleasant 46year old who has psoriatic a Last week gave a medrol chuck - - it helped. Until this Saturday. His flare is better. He hasn't had flare up for years. No stress at the time.    He recently dx with vit d def and given vit d supplmeentaiton  He had a flare up of his elbows to his wrists a left pointer finger surgery   • WRIST FRACTURE SURGERY      right wrist      Family History   Problem Relation Age of Onset   • Cancer Father         sarcoma   • Eye Problems Mother    • Skin cancer Mother    • Other ([facility]) Mother    • Other (lupus) Sister ABD: Soft Non tender, no HSM felt, BS positive  Joint exam:   B/l wrist tender -with mild to +1 swleling, tender with flexion and extension.    chroinc fullness in his hands ,  tneder mild in 1-5th mcps b/l   Tender in b/l elbows - intact rom   Can now  zoe Moo Sandhu is a 52year old male who presents for Patient presents with: Follow - Up  Psoriatic Arthritis      1.  Psoriatic arthritis -   Having 1st flare in a long time - otherwise was Doing well -   Did ok over long taper and then staying on prend

## 2019-09-29 RX ORDER — ERGOCALCIFEROL 1.25 MG/1
CAPSULE ORAL
Qty: 12 CAPSULE | Refills: 0 | Status: SHIPPED | OUTPATIENT
Start: 2019-09-29 | End: 2019-12-19

## 2019-10-09 ENCOUNTER — TELEPHONE (OUTPATIENT)
Dept: NEUROLOGY | Facility: CLINIC | Age: 54
End: 2019-10-09

## 2019-11-08 ENCOUNTER — TELEPHONE (OUTPATIENT)
Dept: INTERNAL MEDICINE CLINIC | Facility: CLINIC | Age: 54
End: 2019-11-08

## 2019-11-08 NOTE — TELEPHONE ENCOUNTER
Patient called LM on nurse line informing us that he has moved to [place] and has a Dr out there.  He is no longer Dr Yumiko Rodas patient

## 2019-12-20 RX ORDER — ERGOCALCIFEROL 1.25 MG/1
CAPSULE ORAL
Qty: 12 CAPSULE | Refills: 0 | Status: SHIPPED | OUTPATIENT
Start: 2019-12-20

## 2020-02-25 ENCOUNTER — TELEPHONE (OUTPATIENT)
Dept: RHEUMATOLOGY | Facility: CLINIC | Age: 55
End: 2020-02-25

## 2020-02-25 NOTE — TELEPHONE ENCOUNTER
Ramakrishna from [employer] will be faxing over a prior authorization form for this patient. Gerardo code: Mary Polo    Thank you.

## 2020-02-28 NOTE — TELEPHONE ENCOUNTER
PA approved through 2/26/2020. Approval # [id number]. [employer] representative [address] notified.

## 2020-03-03 NOTE — TELEPHONE ENCOUNTER
Left message for patient to call back and update insurance information. [facility] was unable to find Medicaid ID# through [employer].

## 2020-03-03 NOTE — TELEPHONE ENCOUNTER
Yelitza from [employer] called back stating that patient is currently covered through medicaid, and Dr. Nikita Escobar is not enrolled in their system.      She states that a letter is needed for the patient stating that patient wants to be followed

## 2020-03-25 NOTE — TELEPHONE ENCOUNTER
Requested Prescriptions     Pending Prescriptions Disp Refills   • Adalimumab (HUMIRA) 40 MG/0.4ML Subcutaneous Prefilled Syringe Kit 6 each 1     Sig: INJECT ONE PRE-FILLED SYRINGE AS DIRECTED BY PHYSICIAN ONCE EVERY 14 DAYS     Last filled:9/9/19 #6 each

## 2020-07-06 RX ORDER — PREDNISONE 10 MG/1
10 TABLET ORAL DAILY
Qty: 30 TABLET | Refills: 0 | Status: SHIPPED | OUTPATIENT
Start: 2020-07-06

## 2020-07-06 NOTE — TELEPHONE ENCOUNTER
Ask pt.  If he is having a flare up - and if he needs to talk to some one - sent in prednisone but not sure which pharmacy he wants - [facility] was [facility] as where the request was coming from - switched it to [address] [place] [address] Goldie Farris

## 2020-07-07 NOTE — TELEPHONE ENCOUNTER
Talked to pt and he is not having a flare up. His Rx is on automatic refill. He doesn't need any at this time.

## 2020-07-22 NOTE — TELEPHONE ENCOUNTER
Last filled: 3/25/30 #6 each with 1 refill  LOV: 9/9/19  No future appointments. Labs: 4/29/19    Summary:  1. humira 40mg every other week   2. See how you do off prednisone   3. Consider adding plaquenil if a few symptoms that are bothersome that seem to be related to humira -   4. Return to clinic in 4- 6  months. - he moved to [facility] is in the transition -   Felicia Alfonso MD  9/9/2019   9:03 AM  - [place]ed [place]  through [facility]  Please advise.

## 2020-07-24 ENCOUNTER — TELEPHONE (OUTPATIENT)
Dept: RHEUMATOLOGY | Facility: CLINIC | Age: 55
End: 2020-07-24

## 2020-07-24 NOTE — TELEPHONE ENCOUNTER
Sofiya/[facility]/[phone] option 2. Per sofiya the insurance does not have Dr. Edmond Courtney listed in the [facility] program. Please call to get the doctor enrolled at [phone] option 4.  Per Uzair Rivera cannot fill the medication for the

## 2020-07-27 NOTE — TELEPHONE ENCOUNTER
Per [facility] representative, in order for prescription to be processed office needs to contact Medicaid [address] program by calling [phone] opt 4 to enroll provider in this program.    Pharmacy will fax additional information regarding

## 2020-07-28 NOTE — TELEPHONE ENCOUNTER
[employer] is a Medicaid Program for the state [facility]. Pt would need to see a new provider, correct?

## 2020-07-29 NOTE — TELEPHONE ENCOUNTER
Spoke with patient and advised he need to establish care with another [facility] provider. Pt verbalized understanding and is in the process of working the issues out with his new insurance.

## 2020-07-29 NOTE — TELEPHONE ENCOUNTER
Please let him know that He needs to see another provider for him to get the medication - since It's a different state

## 2020-08-31 RX ORDER — ERGOCALCIFEROL 1.25 MG/1
CAPSULE ORAL
Qty: 12 CAPSULE | Refills: 0 | OUTPATIENT
Start: 2020-08-31

## 2020-09-01 NOTE — TELEPHONE ENCOUNTER
Requested Prescriptions     Pending Prescriptions Disp Refills   • ERGOCALCIFEROL 09195 units Oral Cap [Pharmacy Med Name: VITAMIN D2 50,000IU (ERGO) CAP RX] 12 capsule 0     Sig: TAKE 1 CAPSULE BY MOUTH 1 TIME A WEEK     Last office visit: 4-5-19  Martha Sorto
Detail Level: Generalized
Detail Level: Zone

## 2020-12-03 NOTE — PROGRESS NOTES
Cece Lutz : 1965     HPI:   Patient presents with:  Seizures: LOV: 16. Patient is here for a yearly f/u on seizures. Patient denies any seizures since last visit.  Pt is taking Carbamazepine 600 mg BID and Lamotrigine 150 mg 2 tabs in Topics            Concern  Caffeine Concern        No    Comment:soda  Exercise                Yes    Comment:working    Social History Narrative    The patient does not use an assistive device. .      The patient does live in a home with stairs. office visit. He is seen regularly by his internist, who is also following his lipids.   We discussed the option of trying monotherapy, but because of difficulty controlling the seizures in the past, he is not interested in changing the medications at this Size Of Margin In Cm: 0.4

## (undated) NOTE — LETTER
10/09/19        Dillan Custard  [address]  [place]  [address] [address]      Dear Lali Bustillo,    [facility] records indicate that you have outstanding lab work and or testing that was ordered for you and has not yet been completed:  Orders Placed This Encounter      CBC,

## (undated) NOTE — LETTER
[address], [address], [address] [facility]  [address] [facility], [address]  [address]  [phone]        Dear Audree Mohs, MD,      I had the pleasure of seeing your patient, Sammy Hairston on 12/19/2017.      Below pl Marital status: Single              Spouse name:                       Years of education:                 Number of children:               Social History Main Topics    Smoking status: Never Smoker DTR: 2+ in the upper and lower extremities,  No Babinski, no hoffmans, no clonus  Coordination: Finger to nose, heel to shin intact bilaterally. Gait: Narrow based, negative Romberg’s sign. Can stand on heels and toes.     ASSESSMENT AND PLAN:     Shanae Moses